# Patient Record
Sex: FEMALE | Race: WHITE | NOT HISPANIC OR LATINO | Employment: OTHER | URBAN - METROPOLITAN AREA
[De-identification: names, ages, dates, MRNs, and addresses within clinical notes are randomized per-mention and may not be internally consistent; named-entity substitution may affect disease eponyms.]

---

## 2017-02-06 DIAGNOSIS — R64 PULMONARY CACHEXIA DUE TO CHRONIC OBSTRUCTIVE PULMONARY DISEASE: ICD-10-CM

## 2017-02-06 DIAGNOSIS — J44.9 PULMONARY CACHEXIA DUE TO CHRONIC OBSTRUCTIVE PULMONARY DISEASE: ICD-10-CM

## 2017-02-06 DIAGNOSIS — F17.200 TOBACCO DEPENDENCE: ICD-10-CM

## 2017-02-06 DIAGNOSIS — J44.9 COPD, SEVERE: ICD-10-CM

## 2017-02-06 RX ORDER — FLUTICASONE PROPIONATE 250 UG/1
1 POWDER, METERED RESPIRATORY (INHALATION) 2 TIMES DAILY
Qty: 60 EACH | Refills: 3 | Status: SHIPPED | OUTPATIENT
Start: 2017-02-06 | End: 2017-06-12 | Stop reason: SDUPTHER

## 2017-05-31 ENCOUNTER — TELEPHONE (OUTPATIENT)
Dept: PULMONOLOGY | Facility: CLINIC | Age: 57
End: 2017-05-31

## 2017-05-31 NOTE — TELEPHONE ENCOUNTER
----- Message from Mady Morris LPN sent at 5/31/2017 11:33 AM CDT -----  Contact: self    ----- Message -----  From: Rosa Maria Holguin  Sent: 5/31/2017   9:56 AM  To: [facility] Staff    Patient would like to schedule an appointment at [facility] in the afternoon for the week of 6/5 to 6/9. Patient would like to be seen earlier but can not take appointment for today(5/31) or 6/1 because she will need to provide transportation a two day notice. Please call back at [phone].    Thanks,  Rosa Maria Holguin

## 2017-06-12 DIAGNOSIS — J44.9 COPD, SEVERE: ICD-10-CM

## 2017-06-12 DIAGNOSIS — R64 PULMONARY CACHEXIA DUE TO CHRONIC OBSTRUCTIVE PULMONARY DISEASE: ICD-10-CM

## 2017-06-12 DIAGNOSIS — J44.9 PULMONARY CACHEXIA DUE TO CHRONIC OBSTRUCTIVE PULMONARY DISEASE: ICD-10-CM

## 2017-06-12 DIAGNOSIS — J44.9 CHRONIC OBSTRUCTIVE PULMONARY DISEASE, UNSPECIFIED COPD TYPE: ICD-10-CM

## 2017-06-12 DIAGNOSIS — F17.200 TOBACCO DEPENDENCE: ICD-10-CM

## 2017-06-12 RX ORDER — FLUTICASONE PROPIONATE 250 UG/1
1 POWDER, METERED RESPIRATORY (INHALATION) 2 TIMES DAILY
Qty: 60 EACH | Refills: 3 | Status: SHIPPED | OUTPATIENT
Start: 2017-06-12 | End: 2017-09-25 | Stop reason: CLARIF

## 2017-06-23 ENCOUNTER — OFFICE VISIT (OUTPATIENT)
Dept: PULMONOLOGY | Facility: CLINIC | Age: 57
End: 2017-06-23
Payer: MEDICAID

## 2017-06-23 VITALS
BODY MASS INDEX: 15.4 KG/M2 | HEART RATE: 106 BPM | DIASTOLIC BLOOD PRESSURE: 70 MMHG | HEIGHT: 61 IN | OXYGEN SATURATION: 97 % | RESPIRATION RATE: 22 BRPM | SYSTOLIC BLOOD PRESSURE: 110 MMHG | WEIGHT: 81.56 LBS

## 2017-06-23 DIAGNOSIS — R64 PULMONARY CACHEXIA DUE TO COPD: Primary | ICD-10-CM

## 2017-06-23 DIAGNOSIS — J30.89 NON-SEASONAL ALLERGIC RHINITIS, UNSPECIFIED ALLERGIC RHINITIS TRIGGER: ICD-10-CM

## 2017-06-23 DIAGNOSIS — J44.9 COPD, VERY SEVERE: ICD-10-CM

## 2017-06-23 DIAGNOSIS — J44.9 PULMONARY CACHEXIA DUE TO COPD: Primary | ICD-10-CM

## 2017-06-23 DIAGNOSIS — B37.9 ANTIBIOTIC-INDUCED YEAST INFECTION: ICD-10-CM

## 2017-06-23 DIAGNOSIS — T36.95XA ANTIBIOTIC-INDUCED YEAST INFECTION: ICD-10-CM

## 2017-06-23 DIAGNOSIS — F17.200 TOBACCO DEPENDENCE: ICD-10-CM

## 2017-06-23 PROCEDURE — 99999 PR PBB SHADOW E&M-EST. PATIENT-LVL III: CPT | Mod: PBBFAC,,, | Performed by: NURSE PRACTITIONER

## 2017-06-23 PROCEDURE — 99213 OFFICE O/P EST LOW 20 MIN: CPT | Mod: PBBFAC | Performed by: NURSE PRACTITIONER

## 2017-06-23 PROCEDURE — 99214 OFFICE O/P EST MOD 30 MIN: CPT | Mod: S$PBB,,, | Performed by: NURSE PRACTITIONER

## 2017-06-23 RX ORDER — FLUCONAZOLE 150 MG/1
150 TABLET ORAL ONCE
Qty: 2 TABLET | Refills: 0 | Status: SHIPPED | OUTPATIENT
Start: 2017-06-23 | End: 2017-06-23

## 2017-06-23 RX ORDER — FLUTICASONE PROPIONATE 50 MCG
2 SPRAY, SUSPENSION (ML) NASAL DAILY
Qty: 1 BOTTLE | Refills: 11 | Status: SHIPPED | OUTPATIENT
Start: 2017-06-23

## 2017-06-23 RX ORDER — PREDNISONE 20 MG/1
TABLET ORAL
Qty: 20 TABLET | Refills: 0 | Status: SHIPPED | OUTPATIENT
Start: 2017-06-23 | End: 2017-12-20

## 2017-06-23 RX ORDER — LEVOFLOXACIN 500 MG/1
500 TABLET, FILM COATED ORAL DAILY
Qty: 10 TABLET | Refills: 0 | Status: SHIPPED | OUTPATIENT
Start: 2017-06-23 | End: 2017-07-03

## 2017-06-23 RX ORDER — IPRATROPIUM BROMIDE AND ALBUTEROL SULFATE 2.5; .5 MG/3ML; MG/3ML
3 SOLUTION RESPIRATORY (INHALATION) EVERY 4 HOURS PRN
Status: ON HOLD | COMMUNITY
End: 2017-12-29

## 2017-06-23 NOTE — ASSESSMENT & PLAN NOTE
Began Daliresp in October 7, 2016, she doubles dose for 7 days each month since obtaining.  Has lost 15 lbs since October 2106 from 96 lbs to 81 lbs.   She stopped supplements such as boost, reports has tried almost all of them available and causes diarrhea same day, so stopped in Jan 2017 high calorie supplements after many months trial off and on.   Take Daliresp only as directed once daily.  Begin adding high calorie foods with her bread daily (almond butter, nutella, cream cheese, other cheese spreads).   Eat 5-7 small high calorie meals each day.

## 2017-06-23 NOTE — ASSESSMENT & PLAN NOTE
Current smoker about 1/2 pk/day.  Complete cessation.  Declines program related to transportation and no desire for complete cessation.

## 2017-06-23 NOTE — ASSESSMENT & PLAN NOTE
Last damian 6/21/2017 FEV1 0.83 (34% predicted).   Continue flovent 250, Serevent 50 mcg/dose, Daliresp one daily only. Duo neb if needed   Stop spiriva handihaler.   Begin Incruse 62.5 mg which is increase dose indicated for severe copd   Provided levaquin and prednisone taper to have on hand if needed for acute flare.  Presently stable, no indication for antibiotic or prednisone therapy.
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
## 2017-07-17 ENCOUNTER — TELEPHONE (OUTPATIENT)
Dept: PULMONOLOGY | Facility: CLINIC | Age: 57
End: 2017-07-17

## 2017-07-17 NOTE — TELEPHONE ENCOUNTER
----- Message from Adalgisa Cisneros sent at 7/17/2017 11:38 AM CDT -----  Would like to speak to nurse about appt rescheduling and inhaler. Please call back at [phone]. thanks

## 2017-09-14 ENCOUNTER — TELEPHONE (OUTPATIENT)
Dept: PULMONOLOGY | Facility: CLINIC | Age: 57
End: 2017-09-14

## 2017-09-14 NOTE — TELEPHONE ENCOUNTER
Returned patient call, instructed to have pharmacy fax request medication needing P/A patient stated understanding

## 2017-09-25 DIAGNOSIS — J44.9 COPD, VERY SEVERE: Primary | ICD-10-CM

## 2017-09-26 ENCOUNTER — TELEPHONE (OUTPATIENT)
Dept: PULMONOLOGY | Facility: CLINIC | Age: 57
End: 2017-09-26

## 2017-11-24 DIAGNOSIS — J44.9 COPD, VERY SEVERE: ICD-10-CM

## 2017-11-28 ENCOUNTER — TELEPHONE (OUTPATIENT)
Dept: PHARMACY | Facility: CLINIC | Age: 57
End: 2017-11-28

## 2017-11-28 NOTE — TELEPHONE ENCOUNTER
Called and spoke with patient concerning Arnuity Ellipta inhaler refill to soon.     Patient indicated that she does not use our pharmacy and only uses [facility].    Called [facility] and Arnuity Ellipta is ready for a $3.00 copayment.    Called patient back to let her know prescription was ready at [facility].

## 2017-12-18 ENCOUNTER — TELEPHONE (OUTPATIENT)
Dept: PULMONOLOGY | Facility: CLINIC | Age: 57
End: 2017-12-18

## 2017-12-18 NOTE — TELEPHONE ENCOUNTER
Pt unable to get transportation to [facility] for appt offered for today. Pt states she will go to urgent care close to her home instead.

## 2017-12-18 NOTE — TELEPHONE ENCOUNTER
----- Message from Jessica Sanchez sent at 12/18/2017  9:38 AM CST -----  Contact: pt  The pt states she needs a antibiotic called in, no additional info given, pt can be reached at [phone]///[email or web address]

## 2017-12-19 ENCOUNTER — TELEPHONE (OUTPATIENT)
Dept: PULMONOLOGY | Facility: CLINIC | Age: 57
End: 2017-12-19

## 2017-12-19 DIAGNOSIS — R05.9 COUGH: Primary | ICD-10-CM

## 2017-12-19 NOTE — TELEPHONE ENCOUNTER
Returned patient call patient c/o increased cough, appointment schedule for 12/20/17 with chest xray patient stated understanding

## 2017-12-20 ENCOUNTER — HOSPITAL ENCOUNTER (OUTPATIENT)
Dept: RADIOLOGY | Facility: HOSPITAL | Age: 57
Discharge: HOME | End: 2017-12-20
Attending: NURSE PRACTITIONER
Payer: MEDICAID

## 2017-12-20 ENCOUNTER — OFFICE VISIT (OUTPATIENT)
Dept: SLEEP MEDICINE | Facility: CLINIC | Age: 57
End: 2017-12-20
Payer: MEDICAID

## 2017-12-20 VITALS
RESPIRATION RATE: 16 BRPM | DIASTOLIC BLOOD PRESSURE: 74 MMHG | HEIGHT: 61 IN | WEIGHT: 94.38 LBS | HEART RATE: 109 BPM | OXYGEN SATURATION: 93 % | BODY MASS INDEX: 17.82 KG/M2 | SYSTOLIC BLOOD PRESSURE: 116 MMHG

## 2017-12-20 DIAGNOSIS — J96.10 CHRONIC RESPIRATORY FAILURE, UNSPECIFIED WHETHER WITH HYPOXIA OR HYPERCAPNIA: ICD-10-CM

## 2017-12-20 DIAGNOSIS — J44.9 COPD, VERY SEVERE: ICD-10-CM

## 2017-12-20 DIAGNOSIS — J44.1 COPD EXACERBATION: Primary | ICD-10-CM

## 2017-12-20 PROCEDURE — 71020 XR CHEST PA AND LATERAL: CPT | Mod: 26,,, | Performed by: RADIOLOGY

## 2017-12-20 PROCEDURE — 71020 XR CHEST PA AND LATERAL: CPT | Mod: TC,PO

## 2017-12-20 PROCEDURE — 99999 PR PBB SHADOW E&M-EST. PATIENT-LVL III: CPT | Mod: PBBFAC,,, | Performed by: NURSE PRACTITIONER

## 2017-12-20 PROCEDURE — 99214 OFFICE O/P EST MOD 30 MIN: CPT | Mod: S$PBB,,, | Performed by: NURSE PRACTITIONER

## 2017-12-20 PROCEDURE — 99213 OFFICE O/P EST LOW 20 MIN: CPT | Mod: PBBFAC,25,PO | Performed by: NURSE PRACTITIONER

## 2017-12-20 RX ORDER — PREDNISONE 20 MG/1
TABLET ORAL
Qty: 21 TABLET | Refills: 0 | Status: ON HOLD | OUTPATIENT
Start: 2017-12-20 | End: 2017-12-29

## 2017-12-20 RX ORDER — AZITHROMYCIN 250 MG/1
TABLET, FILM COATED ORAL
Qty: 6 TABLET | Refills: 0 | Status: ON HOLD | OUTPATIENT
Start: 2017-12-20 | End: 2017-12-29 | Stop reason: HOSPADM

## 2017-12-20 NOTE — PROGRESS NOTES
"Subjective:      Patient ID: Candace Wright is a 57 y.o. female.    Chief Complaint: Cough    HPI  Patient with COPD, chronic respiratory failure, severe scoliosis presents to the office today with complaints of increased cough and congestion.  She has darker colored mucus.  Night sweats.  She is compliant with her pulmonary inhalers.  Increase cough for 5 days.  She states her symptoms started off as ALLERGIES, postnasal drip which stripped down into her chest      /74   Pulse 109   Resp 16   Ht 5' 1.2" (1.554 m)   Wt 42.8 kg (94 lb 5.7 oz)   LMP  (LMP Unknown)   SpO2 (!) 93%   BMI 17.71 kg/m²   Body mass index is 17.71 kg/m².    Review of Systems   Constitutional: Positive for night sweats.   HENT: Positive for postnasal drip.    Respiratory: Positive for cough, sputum production, shortness of breath and wheezing.    Gastrointestinal: Negative.      Objective:      Physical Exam   Constitutional: She is oriented to person, place, and time. She appears well-developed and well-nourished.   HENT:   Head: Normocephalic and atraumatic.   Neck: Normal range of motion. Neck supple.   Cardiovascular: Normal rate and regular rhythm.    Pulmonary/Chest: She has wheezes.   Rhonchi which improves with cough   Musculoskeletal: Normal range of motion. She exhibits no edema.   Neurological: She is alert and oriented to person, place, and time.   Skin: Skin is warm and dry.   Psychiatric: She has a normal mood and affect.     Personal Diagnostic Review      Assessment:       1. COPD exacerbation    2. Chronic respiratory failure, unspecified whether with hypoxia or hypercapnia        Outpatient Encounter Prescriptions as of 12/20/2017   Medication Sig Dispense Refill    albuterol-ipratropium 2.5mg-0.5mg/3mL (DUO-NEB) 0.5 mg-3 mg(2.5 mg base)/3 mL nebulizer solution Take 3 mLs by nebulization every 4 (four) hours as needed for Wheezing or Shortness of Breath. Rescue      fluticasone (FLONASE) 50 mcg/actuation " nasal spray 2 sprays by Each Nare route once daily. 1 Bottle 11    fluticasone furoate (ARNUITY ELLIPTA) 100 mcg/actuation DsDv Inhale 100 mcg into the lungs once daily. Controller 30 each 5    morphine (MS CONTIN) 30 MG 12 hr tablet   0    oxycodone-acetaminophen (PERCOCET)  mg per tablet Take 1 tablet by mouth every 6 (six) hours as needed for Pain (Pain). 10 tablet 0    OXYGEN-AIR DELIVERY SYSTEMS MISC by Misc.(Non-Drug; Combo Route) route.      roflumilast 500 mcg Tab Take 1 tablet (500 mcg total) by mouth once daily. 30 tablet 12    salmeterol (SEREVENT) 50 mcg/dose diskus inhaler Inhale 1 puff into the lungs 2 (two) times daily. 1 each 5    umeclidinium 62.5 mcg/actuation DsDv Inhale 1 puff into the lungs once daily. 30 each 11    [DISCONTINUED] predniSONE (DELTASONE) 20 MG tablet 3 daily x 3 days, 2 daily x 3 days, 1 daily x 3 days, 1/2 daily x 4 days. 20 tablet 0    azithromycin (ZITHROMAX Z-MELI) 250 MG tablet Take pack as directed 6 tablet 0    predniSONE (DELTASONE) 20 MG tablet 3 tab for 3 days. 2 tab for 3 days. 1 tab for 3 days. 1/2 tab for 3 days 21 tablet 0     No facility-administered encounter medications on file as of 12/20/2017.      No orders of the defined types were placed in this encounter.    Plan:      zpak, prednsione taper.  Continue current pulmonary regimen.

## 2017-12-27 ENCOUNTER — TELEPHONE (OUTPATIENT)
Dept: PULMONOLOGY | Facility: CLINIC | Age: 57
End: 2017-12-27

## 2017-12-27 NOTE — TELEPHONE ENCOUNTER
Patient complains of cough not getting any better. Seen 12/20/17 treated with zpak and prednisone. Patient requesting something for cough to be sent to her pharmacy

## 2017-12-27 NOTE — TELEPHONE ENCOUNTER
----- Message from Adalgisa Snachez sent at 12/27/2017 10:12 AM CST -----  Patient state she was in the office on last week and was prescribed an antibiotic. State she is out of the antibiotic, but her condition is not getting better. Please adv/call [phone].//thanks. cw

## 2017-12-28 ENCOUNTER — HOSPITAL ENCOUNTER (OUTPATIENT)
Facility: HOSPITAL | Age: 57
Discharge: HOME | End: 2017-12-29
Attending: EMERGENCY MEDICINE | Admitting: INTERNAL MEDICINE
Payer: MEDICAID

## 2017-12-28 DIAGNOSIS — R06.02 SOB (SHORTNESS OF BREATH): ICD-10-CM

## 2017-12-28 DIAGNOSIS — J96.21 ACUTE ON CHRONIC RESPIRATORY FAILURE WITH HYPOXIA: ICD-10-CM

## 2017-12-28 DIAGNOSIS — J44.1 COPD WITH ACUTE EXACERBATION: Primary | ICD-10-CM

## 2017-12-28 LAB
ALBUMIN SERPL BCP-MCNC: 3.3 G/DL
ALLENS TEST: ABNORMAL
ALP SERPL-CCNC: 93 U/L
ALT SERPL W/O P-5'-P-CCNC: 10 U/L
ANION GAP SERPL CALC-SCNC: 11 MMOL/L
AST SERPL-CCNC: 13 U/L
BASOPHILS # BLD AUTO: 0.01 K/UL
BASOPHILS NFR BLD: 0.1 %
BILIRUB SERPL-MCNC: 0.2 MG/DL
BNP SERPL-MCNC: 28 PG/ML
BUN SERPL-MCNC: 14 MG/DL
CALCIUM SERPL-MCNC: 8.5 MG/DL
CHLORIDE SERPL-SCNC: 104 MMOL/L
CK SERPL-CCNC: 81 U/L
CO2 SERPL-SCNC: 25 MMOL/L
CREAT SERPL-MCNC: 0.7 MG/DL
D DIMER PPP IA.FEU-MCNC: <0.19 MG/L FEU
DELSYS: ABNORMAL
DIFFERENTIAL METHOD: ABNORMAL
EOSINOPHIL # BLD AUTO: 0 K/UL
EOSINOPHIL NFR BLD: 0.1 %
ERYTHROCYTE [DISTWIDTH] IN BLOOD BY AUTOMATED COUNT: 14.6 %
EST. GFR  (AFRICAN AMERICAN): >60 ML/MIN/1.73 M^2
EST. GFR  (NON AFRICAN AMERICAN): >60 ML/MIN/1.73 M^2
FIO2: 32
FLUAV AG SPEC QL IA: NEGATIVE
FLUBV AG SPEC QL IA: NEGATIVE
GLUCOSE SERPL-MCNC: 107 MG/DL
HCO3 UR-SCNC: 28.4 MMOL/L (ref 24–28)
HCT VFR BLD AUTO: 39.7 %
HGB BLD-MCNC: 13.3 G/DL
LYMPHOCYTES # BLD AUTO: 0.6 K/UL
LYMPHOCYTES NFR BLD: 3.2 %
MCH RBC QN AUTO: 31.4 PG
MCHC RBC AUTO-ENTMCNC: 33.5 G/DL
MCV RBC AUTO: 94 FL
MODE: ABNORMAL
MONOCYTES # BLD AUTO: 0.6 K/UL
MONOCYTES NFR BLD: 3.3 %
NEUTROPHILS # BLD AUTO: 18.4 K/UL
NEUTROPHILS NFR BLD: 93.3 %
PCO2 BLDA: 39.3 MMHG (ref 35–45)
PH SMN: 7.47 [PH] (ref 7.35–7.45)
PLATELET # BLD AUTO: 367 K/UL
PMV BLD AUTO: 8.7 FL
PO2 BLDA: 68 MMHG (ref 80–100)
POC BE: 5 MMOL/L
POC SATURATED O2: 94 % (ref 95–100)
POTASSIUM SERPL-SCNC: 4.7 MMOL/L
PROT SERPL-MCNC: 6.7 G/DL
RBC # BLD AUTO: 4.23 M/UL
SAMPLE: ABNORMAL
SITE: ABNORMAL
SODIUM SERPL-SCNC: 140 MMOL/L
SPECIMEN SOURCE: NORMAL
T4 FREE SERPL-MCNC: 0.99 NG/DL
TROPONIN I SERPL DL<=0.01 NG/ML-MCNC: <0.006 NG/ML
TROPONIN I SERPL DL<=0.01 NG/ML-MCNC: <0.006 NG/ML
TSH SERPL DL<=0.005 MIU/L-ACNC: 0.03 UIU/ML
WBC # BLD AUTO: 19.67 K/UL

## 2017-12-28 PROCEDURE — 84484 ASSAY OF TROPONIN QUANT: CPT | Mod: 91

## 2017-12-28 PROCEDURE — 85025 COMPLETE CBC W/AUTO DIFF WBC: CPT

## 2017-12-28 PROCEDURE — G0378 HOSPITAL OBSERVATION PER HR: HCPCS

## 2017-12-28 PROCEDURE — 85379 FIBRIN DEGRADATION QUANT: CPT

## 2017-12-28 PROCEDURE — 94640 AIRWAY INHALATION TREATMENT: CPT

## 2017-12-28 PROCEDURE — 93010 ELECTROCARDIOGRAM REPORT: CPT | Mod: ,,, | Performed by: INTERNAL MEDICINE

## 2017-12-28 PROCEDURE — 25000242 PHARM REV CODE 250 ALT 637 W/ HCPCS: Performed by: PHYSICIAN ASSISTANT

## 2017-12-28 PROCEDURE — 83880 ASSAY OF NATRIURETIC PEPTIDE: CPT

## 2017-12-28 PROCEDURE — 82550 ASSAY OF CK (CPK): CPT

## 2017-12-28 PROCEDURE — 36415 COLL VENOUS BLD VENIPUNCTURE: CPT

## 2017-12-28 PROCEDURE — 84443 ASSAY THYROID STIM HORMONE: CPT

## 2017-12-28 PROCEDURE — 87400 INFLUENZA A/B EACH AG IA: CPT | Mod: 59

## 2017-12-28 PROCEDURE — 99285 EMERGENCY DEPT VISIT HI MDM: CPT | Mod: 25

## 2017-12-28 PROCEDURE — 82803 BLOOD GASES ANY COMBINATION: CPT

## 2017-12-28 PROCEDURE — 25000003 PHARM REV CODE 250: Performed by: PHYSICIAN ASSISTANT

## 2017-12-28 PROCEDURE — 80053 COMPREHEN METABOLIC PANEL: CPT

## 2017-12-28 PROCEDURE — 84484 ASSAY OF TROPONIN QUANT: CPT

## 2017-12-28 PROCEDURE — 84439 ASSAY OF FREE THYROXINE: CPT

## 2017-12-28 PROCEDURE — 25000242 PHARM REV CODE 250 ALT 637 W/ HCPCS: Performed by: EMERGENCY MEDICINE

## 2017-12-28 PROCEDURE — 36600 WITHDRAWAL OF ARTERIAL BLOOD: CPT

## 2017-12-28 PROCEDURE — 63600175 PHARM REV CODE 636 W HCPCS: Performed by: PHYSICIAN ASSISTANT

## 2017-12-28 PROCEDURE — 99900035 HC TECH TIME PER 15 MIN (STAT)

## 2017-12-28 PROCEDURE — 25000003 PHARM REV CODE 250: Performed by: NURSE PRACTITIONER

## 2017-12-28 PROCEDURE — 27000221 HC OXYGEN, UP TO 24 HOURS

## 2017-12-28 PROCEDURE — 93005 ELECTROCARDIOGRAM TRACING: CPT

## 2017-12-28 RX ORDER — MORPHINE SULFATE 30 MG/1
30 TABLET, FILM COATED, EXTENDED RELEASE ORAL EVERY 12 HOURS
Status: DISCONTINUED | OUTPATIENT
Start: 2017-12-28 | End: 2017-12-28

## 2017-12-28 RX ORDER — ONDANSETRON 2 MG/ML
4 INJECTION INTRAMUSCULAR; INTRAVENOUS EVERY 12 HOURS PRN
Status: DISCONTINUED | OUTPATIENT
Start: 2017-12-28 | End: 2017-12-29 | Stop reason: HOSPADM

## 2017-12-28 RX ORDER — OXYCODONE AND ACETAMINOPHEN 10; 325 MG/1; MG/1
1 TABLET ORAL EVERY 4 HOURS PRN
Status: DISCONTINUED | OUTPATIENT
Start: 2017-12-28 | End: 2017-12-29 | Stop reason: HOSPADM

## 2017-12-28 RX ORDER — LEVALBUTEROL INHALATION SOLUTION 0.63 MG/3ML
0.63 SOLUTION RESPIRATORY (INHALATION)
Status: DISCONTINUED | OUTPATIENT
Start: 2017-12-28 | End: 2017-12-29

## 2017-12-28 RX ORDER — IPRATROPIUM BROMIDE AND ALBUTEROL SULFATE 2.5; .5 MG/3ML; MG/3ML
3 SOLUTION RESPIRATORY (INHALATION)
Status: DISCONTINUED | OUTPATIENT
Start: 2017-12-28 | End: 2017-12-28

## 2017-12-28 RX ORDER — OXYCODONE AND ACETAMINOPHEN 10; 325 MG/1; MG/1
1 TABLET ORAL EVERY 8 HOURS PRN
Status: DISCONTINUED | OUTPATIENT
Start: 2017-12-28 | End: 2017-12-28

## 2017-12-28 RX ORDER — METHYLPREDNISOLONE SOD SUCC 125 MG
125 VIAL (EA) INJECTION
Status: DISCONTINUED | OUTPATIENT
Start: 2017-12-28 | End: 2017-12-28 | Stop reason: SDUPTHER

## 2017-12-28 RX ORDER — ACETAMINOPHEN 325 MG/1
650 TABLET ORAL EVERY 8 HOURS PRN
Status: DISCONTINUED | OUTPATIENT
Start: 2017-12-28 | End: 2017-12-28

## 2017-12-28 RX ORDER — IPRATROPIUM BROMIDE AND ALBUTEROL SULFATE 2.5; .5 MG/3ML; MG/3ML
3 SOLUTION RESPIRATORY (INHALATION)
Status: COMPLETED | OUTPATIENT
Start: 2017-12-28 | End: 2017-12-28

## 2017-12-28 RX ORDER — FLUTICASONE PROPIONATE 50 MCG
2 SPRAY, SUSPENSION (ML) NASAL DAILY
Status: DISCONTINUED | OUTPATIENT
Start: 2017-12-29 | End: 2017-12-29 | Stop reason: HOSPADM

## 2017-12-28 RX ORDER — MOXIFLOXACIN HYDROCHLORIDE 400 MG/1
400 TABLET ORAL DAILY
Status: DISCONTINUED | OUTPATIENT
Start: 2017-12-28 | End: 2017-12-29 | Stop reason: HOSPADM

## 2017-12-28 RX ORDER — MOXIFLOXACIN HYDROCHLORIDE 400 MG/1
400 TABLET ORAL DAILY
Status: DISCONTINUED | OUTPATIENT
Start: 2017-12-28 | End: 2017-12-28

## 2017-12-28 RX ORDER — BUDESONIDE 0.5 MG/2ML
0.5 INHALANT ORAL EVERY 12 HOURS
Status: DISCONTINUED | OUTPATIENT
Start: 2017-12-28 | End: 2017-12-29 | Stop reason: HOSPADM

## 2017-12-28 RX ORDER — ENOXAPARIN SODIUM 100 MG/ML
40 INJECTION SUBCUTANEOUS EVERY 24 HOURS
Status: DISCONTINUED | OUTPATIENT
Start: 2017-12-28 | End: 2017-12-29 | Stop reason: HOSPADM

## 2017-12-28 RX ORDER — PROMETHAZINE HYDROCHLORIDE AND CODEINE PHOSPHATE 6.25; 1 MG/5ML; MG/5ML
5 SOLUTION ORAL ONCE
Status: COMPLETED | OUTPATIENT
Start: 2017-12-28 | End: 2017-12-28

## 2017-12-28 RX ORDER — ROFLUMILAST 500 UG/1
500 TABLET ORAL DAILY
Status: DISCONTINUED | OUTPATIENT
Start: 2017-12-29 | End: 2017-12-29 | Stop reason: HOSPADM

## 2017-12-28 RX ORDER — ARFORMOTEROL TARTRATE 15 UG/2ML
15 SOLUTION RESPIRATORY (INHALATION) 2 TIMES DAILY
Status: DISCONTINUED | OUTPATIENT
Start: 2017-12-28 | End: 2017-12-29 | Stop reason: HOSPADM

## 2017-12-28 RX ADMIN — PROMETHAZINE HYDROCHLORIDE AND CODEINE PHOSPHATE 5 ML: 6.25; 1 SYRUP ORAL at 11:12

## 2017-12-28 RX ADMIN — METHYLPREDNISOLONE SODIUM SUCCINATE 80 MG: 40 INJECTION, POWDER, FOR SOLUTION INTRAMUSCULAR; INTRAVENOUS at 09:12

## 2017-12-28 RX ADMIN — IPRATROPIUM BROMIDE AND ALBUTEROL SULFATE 3 ML: .5; 3 SOLUTION RESPIRATORY (INHALATION) at 09:12

## 2017-12-28 RX ADMIN — BUDESONIDE 0.5 MG: 0.5 SUSPENSION RESPIRATORY (INHALATION) at 09:12

## 2017-12-28 RX ADMIN — ARFORMOTEROL TARTRATE 15 MCG: 15 SOLUTION RESPIRATORY (INHALATION) at 09:12

## 2017-12-28 RX ADMIN — IPRATROPIUM BROMIDE AND ALBUTEROL SULFATE 3 ML: .5; 3 SOLUTION RESPIRATORY (INHALATION) at 02:12

## 2017-12-28 RX ADMIN — OXYCODONE HYDROCHLORIDE AND ACETAMINOPHEN 1 TABLET: 10; 325 TABLET ORAL at 11:12

## 2017-12-28 RX ADMIN — OXYCODONE HYDROCHLORIDE AND ACETAMINOPHEN 1 TABLET: 10; 325 TABLET ORAL at 06:12

## 2017-12-28 RX ADMIN — MOXIFLOXACIN HYDROCHLORIDE 400 MG: 400 TABLET, FILM COATED ORAL at 06:12

## 2017-12-28 RX ADMIN — GUAIFENESIN AND DEXTROMETHORPHAN HYDROBROMIDE 1 TABLET: 600; 30 TABLET, EXTENDED RELEASE ORAL at 09:12

## 2017-12-28 NOTE — ASSESSMENT & PLAN NOTE
-Due to COPD exacerbation.   -Continue oxygen therapy.   -Follow up d-dimer. VQ scan if positive.

## 2017-12-28 NOTE — ED PROVIDER NOTES
"SCRIBE #1 NOTE: I, Sincere Moscosoer, am scribing for, and in the presence of, Obed Macedo MD. I have scribed the entire note.      History      Chief Complaint   Patient presents with    Shortness of Breath       Review of patient's allergies indicates:   Allergen Reactions    Azithromycin Hives     Face swelling and welts    Iodinated contrast- oral and iv dye      "I could die. I had it once in [place] years ago and haven't had it since."     Asa [aspirin]     Bactrim [sulfamethoxazole-trimethoprim] Hives    Doxycycline Hives    Ibuprofen     Pcn [penicillins]     Avelox [moxifloxacin] Rash        HPI   HPI    12/28/2017, 1:32 PM   History obtained from the patient      History of Present Illness: Candace Wright is a 57 y.o. female patient with a PMHx of COPD, who presents to the Emergency Department for SOB which onset gradually today. Sxs are constant and moderate in severity. Pt reports her sxs have been worsening since 12/20. Pt saw PCP on 12/20 for allergies and was Rx prednisone and z-pack without relief. There are no mitigating or exacerbating factors noted. Associated sxs include wheezing.  Pt denies any fever, N/V/D, CP, leg swelling, calf pain, numbness, tingling,  and all other sxs at this time. No further complaints or concerns at this time.       Arrival mode: [facility]    PCP: Clara Meraz MD       Past Medical History:  Past Medical History:   Diagnosis Date    Cervical disc disorder with radiculopathy     COPD (chronic obstructive pulmonary disease)     Lumbar disc disease     Scoliosis     Spondylosis        Past Surgical History:  Past Surgical History:   Procedure Laterality Date    BREAST SURGERY      HYSTERECTOMY      PELVIC LAPAROSCOPY           Family History:  Family History   Problem Relation Age of Onset    Leukemia      Cancer         Social History:  Social History     Social History Main Topics    Smoking status: Light Tobacco Smoker     Types: " Cigarettes    Smokeless tobacco: unknown    Alcohol use No    Drug use: No    Sexual activity: unknown       ROS   Review of Systems   Constitutional: Negative for fever.   HENT: Negative for sore throat.    Respiratory: Positive for shortness of breath. Negative for cough.    Cardiovascular: Positive for chest pain. Negative for palpitations and leg swelling.   Gastrointestinal: Negative for abdominal pain, constipation, diarrhea, nausea and vomiting.   Genitourinary: Negative for dysuria.   Musculoskeletal: Negative for back pain.   Skin: Negative for rash.   Neurological: Negative for dizziness, syncope, weakness, numbness and headaches.   Hematological: Does not bruise/bleed easily.     Physical Exam      Initial Vitals   BP Pulse Resp Temp SpO2   12/28/17 1305 12/28/17 1305 12/28/17 1305 12/28/17 1319 12/28/17 1305   138/71 (!) 120 18 98.9 °F (37.2 °C) 95 %      MAP       12/28/17 1305       93.33          Physical Exam  Nursing Notes and Vital Signs Reviewed.  Constitutional: Patient is in no acute distress. Well-developed and well-nourished.  Head: Atraumatic. Normocephalic.  Eyes: PERRL. EOM intact. Conjunctivae are not pale. No scleral icterus.  ENT: Mucous membranes are moist. Oropharynx is clear and symmetric.    Neck: Supple. Full ROM. No lymphadenopathy.  Cardiovascular: Tachycardia. Regular rhythm. No murmurs, rubs, or gallops. Distal pulses are 2+ and symmetric.  Pulmonary/Chest: No respiratory distress. Diffuse wheezing. Decreased breath sounds bilaterally.  Abdominal: Soft and non-distended.  There is no tenderness.  No rebound, guarding, or rigidity. Good bowel sounds.  Genitourinary: No CVA tenderness  Musculoskeletal: Moves all extremities. No obvious deformities. No edema. No calf tenderness.  Skin: Warm and dry.  Neurological:  Alert, awake, and appropriate.  Normal speech.  No acute focal neurological deficits are appreciated.  Psychiatric: Normal affect. Good eye contact. Appropriate in  content.    ED Course    Procedures  ED Vital Signs:  Vitals:    12/28/17 1305 12/28/17 1319 12/28/17 1407 12/28/17 1419   BP: 138/71      Pulse: (!) 120  91 105   Resp: 18  20    Temp:  98.9 °F (37.2 °C)     TempSrc:  Oral     SpO2: 95%  97%        Abnormal Lab Results:  Labs Reviewed   CBC W/ AUTO DIFFERENTIAL - Abnormal; Notable for the following:        Result Value    WBC 19.67 (*)     MCH 31.4 (*)     RDW 14.6 (*)     Platelets 367 (*)     MPV 8.7 (*)     Gran # 18.4 (*)     Lymph # 0.6 (*)     Gran% 93.3 (*)     Lymph% 3.2 (*)     Mono% 3.3 (*)     All other components within normal limits   COMPREHENSIVE METABOLIC PANEL - Abnormal; Notable for the following:     Calcium 8.5 (*)     Albumin 3.3 (*)     All other components within normal limits   ISTAT PROCEDURE - Abnormal; Notable for the following:     POC PH 7.466 (*)     POC PO2 68 (*)     POC HCO3 28.4 (*)     POC SATURATED O2 94 (*)     All other components within normal limits   B-TYPE NATRIURETIC PEPTIDE   CK   TROPONIN I   INFLUENZA A AND B ANTIGEN   URINALYSIS   D DIMER, QUANTITATIVE        All Lab Results:  Results for orders placed or performed during the hospital encounter of 12/28/17   CBC auto differential   Result Value Ref Range    WBC 19.67 (H) 3.90 - 12.70 K/uL    RBC 4.23 4.00 - 5.40 M/uL    Hemoglobin 13.3 12.0 - 16.0 g/dL    Hematocrit 39.7 37.0 - 48.5 %    MCV 94 82 - 98 fL    MCH 31.4 (H) 27.0 - 31.0 pg    MCHC 33.5 32.0 - 36.0 g/dL    RDW 14.6 (H) 11.5 - 14.5 %    Platelets 367 (H) 150 - 350 K/uL    MPV 8.7 (L) 9.2 - 12.9 fL    Gran # 18.4 (H) 1.8 - 7.7 K/uL    Lymph # 0.6 (L) 1.0 - 4.8 K/uL    Mono # 0.6 0.3 - 1.0 K/uL    Eos # 0.0 0.0 - 0.5 K/uL    Baso # 0.01 0.00 - 0.20 K/uL    Gran% 93.3 (H) 38.0 - 73.0 %    Lymph% 3.2 (L) 18.0 - 48.0 %    Mono% 3.3 (L) 4.0 - 15.0 %    Eosinophil% 0.1 0.0 - 8.0 %    Basophil% 0.1 0.0 - 1.9 %    Differential Method Automated    Comprehensive metabolic panel   Result Value Ref Range    Sodium 140  136 - 145 mmol/L    Potassium 4.7 3.5 - 5.1 mmol/L    Chloride 104 95 - 110 mmol/L    CO2 25 23 - 29 mmol/L    Glucose 107 70 - 110 mg/dL    BUN, Bld 14 6 - 20 mg/dL    Creatinine 0.7 0.5 - 1.4 mg/dL    Calcium 8.5 (L) 8.7 - 10.5 mg/dL    Total Protein 6.7 6.0 - 8.4 g/dL    Albumin 3.3 (L) 3.5 - 5.2 g/dL    Total Bilirubin 0.2 0.1 - 1.0 mg/dL    Alkaline Phosphatase 93 55 - 135 U/L    AST 13 10 - 40 U/L    ALT 10 10 - 44 U/L    Anion Gap 11 8 - 16 mmol/L    eGFR if African American >60 >60 mL/min/1.73 m^2    eGFR if non African American >60 >60 mL/min/1.73 m^2   Brain natriuretic peptide   Result Value Ref Range    BNP 28 0 - 99 pg/mL   CK   Result Value Ref Range    CPK 81 20 - 180 U/L   Troponin I   Result Value Ref Range    Troponin I <0.006 0.000 - 0.026 ng/mL   Influenza antigen Nasopharyngeal Swab   Result Value Ref Range    Influenza A Ag, EIA Negative Negative    Influenza B Ag, EIA Negative Negative    Flu A & B Source Nasopharyngeal Swab    ISTAT PROCEDURE   Result Value Ref Range    POC PH 7.466 (H) 7.35 - 7.45    POC PCO2 39.3 35 - 45 mmHg    POC PO2 68 (L) 80 - 100 mmHg    POC HCO3 28.4 (H) 24 - 28 mmol/L    POC BE 5 -2 to 2 mmol/L    POC SATURATED O2 94 (L) 95 - 100 %    Sample ARTERIAL     Site RR     Allens Test Pass     DelSys Nasal Can     Mode SPONT     FiO2 32          Imaging Results:  Imaging Results          X-Ray Chest AP Portable (Final result)  Result time 12/28/17 14:00:42    Final result by Jo-Ann Vazquez III, MD (12/28/17 14:00:42)                 Impression:     Stable COPD.  No acute disease identified in the chest.        Electronically signed by: JO-ANN VAZQUEZ MD  Date:     12/28/17  Time:    14:00              Narrative:    XR CHEST AP PORTABLE    Clinical history: sob.      Findings: Cardiomediastinal silhouette is within normal limits for AP technique.  Stable COPD with pulmonary hyperinflation and flattening of the diaphragms.  Stable chronic blunting of the costophrenic angles  suggesting pleural scarring.  Stable small bilateral calcified granulomas. The lungs appear clear of active disease. No acute appearing infiltrate, sizable pleural effusion, pneumothorax or other acute disease identified.  Stable significant thoracolumbar scoliosis.                                        The EKG was ordered, reviewed, and independently interpreted by the ED provider.  Interpretation time: 14:06  Rate: 102 BPM  Rhythm: sinus tachycardia  Interpretation: No acute ST changes. No STEMI.    The Emergency Provider reviewed the vital signs and test results, which are outlined above.    ED Discussion     2:59 PM: Re-evaluated pt. I have discussed test results, shared treatment plan, and the need for admission with patient at bedside. Pt expresses understanding at this time and agree with all information. All questions answered. Pt has no further questions or concerns at this time. Pt is ready for admit.    3:03 PM: Discussed case with HONG Pagan ([facility]). Kandace agrees with current care and management of pt and accepts admission.   Admitting Service: Hospital medicine   Admitting Physician: Dr. Kapadia  Admit to: Observation      ED Medication(s):  Medications   methylPREDNISolone sodium succinate injection 125 mg (125 mg Intravenous Not Given 12/28/17 1345)   albuterol-ipratropium 2.5mg-0.5mg/3mL nebulizer solution 3 mL (3 mLs Nebulization Given 12/28/17 1407)       New Prescriptions    No medications on file             Medical Decision Making    Medical Decision Making:   Clinical Tests:   Lab Tests: Reviewed and Ordered  Radiological Study: Reviewed and Ordered  Medical Tests: Reviewed and Ordered           Scribe Attestation:   Scribe #1: I performed the above scribed service and the documentation accurately describes the services I performed. I attest to the accuracy of the note.    Attending:   Physician Attestation Statement for Scribe #1: I, Obed Macedo MD, personally  performed the services described in this documentation, as scribed by Sincere Fournier, in my presence, and it is both accurate and complete.          Clinical Impression       ICD-10-CM ICD-9-CM   1. COPD with acute exacerbation J44.1 491.21   2. SOB (shortness of breath) R06.02 786.05       Disposition:   Disposition: Placed in Observation  Condition: Fair         Obed Macedo MD  12/28/17 4337

## 2017-12-28 NOTE — H&P
[facility] Hospital Medicine  History & Physical    Patient Name: Candace Wright  MRN: [id number]  Admission Date: 12/28/2017  Attending Physician: Obed Macedo MD   Primary Care Provider: Clara Meraz MD         Patient information was obtained from patient, past medical records and ER records.     Subjective:     Principal Problem:Acute on chronic respiratory failure with hypoxia    Chief Complaint:   Chief Complaint   Patient presents with    Shortness of Breath        HPI: Candace Wright is a 57 year old female with COPD and continued tobacco abuse who presented to the Emergency Room with complaints of SOB and cough. The patient reports that her SOB is worse with exertion, but she denies orthopnea. The cough is productive with green sputum production. Symptoms began 2 weeks ago. She was seen in the Pulmonology clinic on 12/20/17 and prescribed Azithromycin and a prednisone taper. She is almost finished with the prednisone and completed the antibiotic several days ago. She denies any improvement in symptoms with these interventions. She has been taking her nebulizer treatments several times daily without any improvement. There is associated intermittent chest heaviness with each nebulizer treatment. She denies fever and chills. In the ED, the patient was found to have mild hypoxia on ABG with a pO2 of 68 on her home dose of nasal cannula oxygen. Her chest x-ray was negative. She had mild tachycardia and her d-dimer is pending. Code status was discussed with the patient. She is a DNR. Her friend, Lindsay Sims, is her surrogate medical decision maker.     Past Medical History:   Diagnosis Date    Cervical disc disorder with radiculopathy     COPD (chronic obstructive pulmonary disease)     Lumbar disc disease     Scoliosis     Spondylosis        Past Surgical History:   Procedure Laterality Date    BREAST SURGERY      HYSTERECTOMY      PELVIC LAPAROSCOPY         Review of  "patient's allergies indicates:   Allergen Reactions    Azithromycin Hives     Face swelling and welts    Iodinated contrast- oral and iv dye      "I could die. I had it once in [place] years ago and haven't had it since."     Asa [aspirin]     Bactrim [sulfamethoxazole-trimethoprim] Hives    Doxycycline Hives    Ibuprofen     Pcn [penicillins]     Avelox [moxifloxacin] Rash       No current facility-administered medications on file prior to encounter.      Current Outpatient Prescriptions on File Prior to Encounter   Medication Sig    albuterol-ipratropium 2.5mg-0.5mg/3mL (DUO-NEB) 0.5 mg-3 mg(2.5 mg base)/3 mL nebulizer solution Take 3 mLs by nebulization every 4 (four) hours as needed for Wheezing or Shortness of Breath. Rescue    azithromycin (ZITHROMAX Z-MELI) 250 MG tablet Take pack as directed    fluticasone (FLONASE) 50 mcg/actuation nasal spray 2 sprays by Each Nare route once daily.    fluticasone furoate (ARNUITY ELLIPTA) 100 mcg/actuation DsDv Inhale 100 mcg into the lungs once daily. Controller    morphine (MS CONTIN) 30 MG 12 hr tablet     oxycodone-acetaminophen (PERCOCET)  mg per tablet Take 1 tablet by mouth every 6 (six) hours as needed for Pain (Pain).    OXYGEN-AIR DELIVERY SYSTEMS MISC by [facility].(Non-Drug; Combo Route) route.    predniSONE (DELTASONE) 20 MG tablet 3 tab for 3 days. 2 tab for 3 days. 1 tab for 3 days. 1/2 tab for 3 days    roflumilast 500 mcg Tab Take 1 tablet (500 mcg total) by mouth once daily.    salmeterol (SEREVENT) 50 mcg/dose diskus inhaler Inhale 1 puff into the lungs 2 (two) times daily.    umeclidinium 62.5 mcg/actuation DsDv Inhale 1 puff into the lungs once daily.     Family History     Problem Relation (Age of Onset)    Cancer     Leukemia         Social History Main Topics    Smoking status: Light Tobacco Smoker     Types: Cigarettes    Smokeless tobacco: Not on file    Alcohol use No    Drug use: No    Sexual activity: Not on file "     Review of Systems   Constitutional: Negative for appetite change, chills, diaphoresis, fatigue and fever.   HENT: Negative for congestion, ear pain, mouth sores, sore throat and trouble swallowing.    Eyes: Negative for pain and visual disturbance.   Respiratory: Positive for cough, chest tightness (with nebulizer treatments) and shortness of breath.    Cardiovascular: Negative for chest pain, palpitations and leg swelling.   Gastrointestinal: Negative for abdominal pain, constipation and nausea.   Endocrine: Negative for cold intolerance, heat intolerance, polydipsia and polyuria.   Genitourinary: Negative for dysuria, frequency and hematuria.   Musculoskeletal: Negative for arthralgias, back pain, myalgias and neck pain.   Skin: Negative for pallor, rash and wound.   Allergic/Immunologic: Negative for environmental allergies and immunocompromised state.   Neurological: Negative for dizziness, seizures, syncope, weakness, numbness and headaches.   Hematological: Negative for adenopathy. Does not bruise/bleed easily.   Psychiatric/Behavioral: Negative for agitation, confusion and sleep disturbance.     Objective:     Vital Signs (Most Recent):  Temp: 98.9 °F (37.2 °C) (12/28/17 1319)  Pulse: 105 (12/28/17 1419)  Resp: 20 (12/28/17 1407)  BP: 138/71 (12/28/17 1305)  SpO2: 97 % (12/28/17 1407) Vital Signs (24h Range):  Temp:  [98.9 °F (37.2 °C)] 98.9 °F (37.2 °C)  Pulse:  [] 105  Resp:  [18-20] 20  SpO2:  [95 %-97 %] 97 %  BP: (138)/(71) 138/71        There is no height or weight on file to calculate BMI.    Physical Exam   Constitutional: She is oriented to person, place, and time. She appears well-developed and well-nourished. She appears ill. No distress.   HENT:   Head: Normocephalic and atraumatic.   Eyes: Conjunctivae are normal.   PERRL   Neck: Neck supple. No JVD present.   Cardiovascular: Regular rhythm and normal heart sounds.  Tachycardia present.    Pulmonary/Chest: Accessory muscle usage  present. Tachypnea noted. She is in respiratory distress (mild). She has wheezes.   Abdominal: Soft. Bowel sounds are normal. She exhibits no distension. There is no tenderness.   Musculoskeletal: Normal range of motion.   Neurological: She is alert and oriented to person, place, and time.   Skin: Skin is warm and dry.   Psychiatric: She has a normal mood and affect. Her behavior is normal. Thought content normal.   Nursing note and vitals reviewed.          Significant Labs:   CBC:   Recent Labs  Lab 12/28/17  1405   WBC 19.67*   HGB 13.3   HCT 39.7   *     CMP:   Recent Labs  Lab 12/28/17  1405      K 4.7      CO2 25      BUN 14   CREATININE 0.7   CALCIUM 8.5*   PROT 6.7   ALBUMIN 3.3*   BILITOT 0.2   ALKPHOS 93   AST 13   ALT 10   ANIONGAP 11   EGFRNONAA >60     Troponin:   Recent Labs  Lab 12/28/17  1405   TROPONINI <0.006     All pertinent labs within the past 24 hours have been reviewed.    Significant Imaging: I have reviewed all pertinent imaging results/findings within the past 24 hours.   Imaging Results          X-Ray Chest AP Portable (Final result)  Result time 12/28/17 14:00:42    Final result by Jo-Ann Vazquez III, MD (12/28/17 14:00:42)                 Impression:     Stable COPD.  No acute disease identified in the chest.        Electronically signed by: JO-ANN VAZQUEZ MD  Date:     12/28/17  Time:    14:00              Narrative:    XR CHEST AP PORTABLE    Clinical history: sob.      Findings: Cardiomediastinal silhouette is within normal limits for AP technique.  Stable COPD with pulmonary hyperinflation and flattening of the diaphragms.  Stable chronic blunting of the costophrenic angles suggesting pleural scarring.  Stable small bilateral calcified granulomas. The lungs appear clear of active disease. No acute appearing infiltrate, sizable pleural effusion, pneumothorax or other acute disease identified.  Stable significant thoracolumbar scoliosis.                                 Assessment/Plan:     * Acute on chronic respiratory failure with hypoxia    -Due to COPD exacerbation.   -Continue oxygen therapy.   -Follow up d-dimer. VQ scan if positive.           COPD with acute exacerbation    -IV steroids.   -Continue Daliresp, inhaled medications and oxygen therapy.   -Empiric Avelox.         Leukocytosis    -Likely due to steroids.   -No infectious source identified.   -Follow up UA.   -Monitor.           Tobacco dependence    -Patient counseled on cessation.   -Declines nicotine patch.             VTE Risk Mitigation         Ordered     enoxaparin injection 40 mg  Daily     Route:  Subcutaneous        12/28/17 2501             HONG Soria  Department of Hospital Medicine   [facility]

## 2017-12-28 NOTE — HPI
Candace Wright is a 57 year old female with COPD and continued tobacco abuse who presented to the Emergency Room with complaints of SOB and cough. The patient reports that her SOB is worse with exertion, but she denies orthopnea. The cough is productive with green sputum production. Symptoms began 2 weeks ago. She was seen in the Pulmonology clinic on 12/20/17 and prescribed Azithromycin and a prednisone taper. She is almost finished with the prednisone and completed the antibiotic several days ago. She denies any improvement in symptoms with these interventions. She has been taking her nebulizer treatments several times daily without any improvement. There is associated intermittent chest heaviness with each nebulizer treatment. She denies fever and chills. In the ED, the patient was found to have mild hypoxia on ABG with a pO2 of 68 on her home dose of nasal cannula oxygen. Her chest x-ray was negative. She had mild tachycardia and her d-dimer is pending. Code status was discussed with the patient. She is a DNR. Her friend, Lindsay Bethany, is her surrogate medical decision maker.

## 2017-12-28 NOTE — SUBJECTIVE & OBJECTIVE
"Past Medical History:   Diagnosis Date    Cervical disc disorder with radiculopathy     COPD (chronic obstructive pulmonary disease)     Lumbar disc disease     Scoliosis     Spondylosis        Past Surgical History:   Procedure Laterality Date    BREAST SURGERY      HYSTERECTOMY      PELVIC LAPAROSCOPY         Review of patient's allergies indicates:   Allergen Reactions    Azithromycin Hives     Face swelling and welts    Iodinated contrast- oral and iv dye      "I could die. I had it once in [place] years ago and haven't had it since."     Asa [aspirin]     Bactrim [sulfamethoxazole-trimethoprim] Hives    Doxycycline Hives    Ibuprofen     Pcn [penicillins]     Avelox [moxifloxacin] Rash       No current facility-administered medications on file prior to encounter.      Current Outpatient Prescriptions on File Prior to Encounter   Medication Sig    albuterol-ipratropium 2.5mg-0.5mg/3mL (DUO-NEB) 0.5 mg-3 mg(2.5 mg base)/3 mL nebulizer solution Take 3 mLs by nebulization every 4 (four) hours as needed for Wheezing or Shortness of Breath. Rescue    azithromycin (ZITHROMAX Z-MELI) 250 MG tablet Take pack as directed    fluticasone (FLONASE) 50 mcg/actuation nasal spray 2 sprays by Each Nare route once daily.    fluticasone furoate (ARNUITY ELLIPTA) 100 mcg/actuation DsDv Inhale 100 mcg into the lungs once daily. Controller    morphine (MS CONTIN) 30 MG 12 hr tablet     oxycodone-acetaminophen (PERCOCET)  mg per tablet Take 1 tablet by mouth every 6 (six) hours as needed for Pain (Pain).    OXYGEN-AIR DELIVERY SYSTEMS MISC by [facility].(Non-Drug; Combo Route) route.    predniSONE (DELTASONE) 20 MG tablet 3 tab for 3 days. 2 tab for 3 days. 1 tab for 3 days. 1/2 tab for 3 days    roflumilast 500 mcg Tab Take 1 tablet (500 mcg total) by mouth once daily.    salmeterol (SEREVENT) 50 mcg/dose diskus inhaler Inhale 1 puff into the lungs 2 (two) times daily.    umeclidinium 62.5 mcg/actuation " DsDv Inhale 1 puff into the lungs once daily.     Family History     Problem Relation (Age of Onset)    Cancer     Leukemia         Social History Main Topics    Smoking status: Light Tobacco Smoker     Types: Cigarettes    Smokeless tobacco: Not on file    Alcohol use No    Drug use: No    Sexual activity: Not on file     Review of Systems   Constitutional: Negative for appetite change, chills, diaphoresis, fatigue and fever.   HENT: Negative for congestion, ear pain, mouth sores, sore throat and trouble swallowing.    Eyes: Negative for pain and visual disturbance.   Respiratory: Positive for cough, chest tightness (with nebulizer treatments) and shortness of breath.    Cardiovascular: Negative for chest pain, palpitations and leg swelling.   Gastrointestinal: Negative for abdominal pain, constipation and nausea.   Endocrine: Negative for cold intolerance, heat intolerance, polydipsia and polyuria.   Genitourinary: Negative for dysuria, frequency and hematuria.   Musculoskeletal: Negative for arthralgias, back pain, myalgias and neck pain.   Skin: Negative for pallor, rash and wound.   Allergic/Immunologic: Negative for environmental allergies and immunocompromised state.   Neurological: Negative for dizziness, seizures, syncope, weakness, numbness and headaches.   Hematological: Negative for adenopathy. Does not bruise/bleed easily.   Psychiatric/Behavioral: Negative for agitation, confusion and sleep disturbance.     Objective:     Vital Signs (Most Recent):  Temp: 98.9 °F (37.2 °C) (12/28/17 1319)  Pulse: 105 (12/28/17 1419)  Resp: 20 (12/28/17 1407)  BP: 138/71 (12/28/17 1305)  SpO2: 97 % (12/28/17 1407) Vital Signs (24h Range):  Temp:  [98.9 °F (37.2 °C)] 98.9 °F (37.2 °C)  Pulse:  [] 105  Resp:  [18-20] 20  SpO2:  [95 %-97 %] 97 %  BP: (138)/(71) 138/71        There is no height or weight on file to calculate BMI.    Physical Exam   Constitutional: She is oriented to person, place, and time. She  appears well-developed and well-nourished. She appears ill. No distress.   HENT:   Head: Normocephalic and atraumatic.   Eyes: Conjunctivae are normal.   PERRL   Neck: Neck supple. No JVD present.   Cardiovascular: Regular rhythm and normal heart sounds.  Tachycardia present.    Pulmonary/Chest: Accessory muscle usage present. Tachypnea noted. She is in respiratory distress (mild). She has wheezes.   Abdominal: Soft. Bowel sounds are normal. She exhibits no distension. There is no tenderness.   Musculoskeletal: Normal range of motion.   Neurological: She is alert and oriented to person, place, and time.   Skin: Skin is warm and dry.   Psychiatric: She has a normal mood and affect. Her behavior is normal. Thought content normal.   Nursing note and vitals reviewed.          Significant Labs:   CBC:   Recent Labs  Lab 12/28/17  1405   WBC 19.67*   HGB 13.3   HCT 39.7   *     CMP:   Recent Labs  Lab 12/28/17  1405      K 4.7      CO2 25      BUN 14   CREATININE 0.7   CALCIUM 8.5*   PROT 6.7   ALBUMIN 3.3*   BILITOT 0.2   ALKPHOS 93   AST 13   ALT 10   ANIONGAP 11   EGFRNONAA >60     Troponin:   Recent Labs  Lab 12/28/17  1405   TROPONINI <0.006     All pertinent labs within the past 24 hours have been reviewed.    Significant Imaging: I have reviewed all pertinent imaging results/findings within the past 24 hours.   Imaging Results          X-Ray Chest AP Portable (Final result)  Result time 12/28/17 14:00:42    Final result by Jo-Ann Vazquez III, MD (12/28/17 14:00:42)                 Impression:     Stable COPD.  No acute disease identified in the chest.        Electronically signed by: JO-ANN VAZQUEZ MD  Date:     12/28/17  Time:    14:00              Narrative:    XR CHEST AP PORTABLE    Clinical history: sob.      Findings: Cardiomediastinal silhouette is within normal limits for AP technique.  Stable COPD with pulmonary hyperinflation and flattening of the diaphragms.  Stable chronic  blunting of the costophrenic angles suggesting pleural scarring.  Stable small bilateral calcified granulomas. The lungs appear clear of active disease. No acute appearing infiltrate, sizable pleural effusion, pneumothorax or other acute disease identified.  Stable significant thoracolumbar scoliosis.

## 2017-12-29 VITALS
TEMPERATURE: 99 F | HEART RATE: 113 BPM | SYSTOLIC BLOOD PRESSURE: 127 MMHG | BODY MASS INDEX: 17.75 KG/M2 | WEIGHT: 94 LBS | OXYGEN SATURATION: 96 % | HEIGHT: 61 IN | RESPIRATION RATE: 18 BRPM | DIASTOLIC BLOOD PRESSURE: 63 MMHG

## 2017-12-29 LAB
ANION GAP SERPL CALC-SCNC: 10 MMOL/L
BASOPHILS # BLD AUTO: 0 K/UL
BASOPHILS NFR BLD: 0 %
BUN SERPL-MCNC: 13 MG/DL
CALCIUM SERPL-MCNC: 8.9 MG/DL
CHLORIDE SERPL-SCNC: 102 MMOL/L
CO2 SERPL-SCNC: 25 MMOL/L
CREAT SERPL-MCNC: 0.7 MG/DL
DIFFERENTIAL METHOD: ABNORMAL
EOSINOPHIL # BLD AUTO: 0 K/UL
EOSINOPHIL NFR BLD: 0 %
ERYTHROCYTE [DISTWIDTH] IN BLOOD BY AUTOMATED COUNT: 14.7 %
EST. GFR  (AFRICAN AMERICAN): >60 ML/MIN/1.73 M^2
EST. GFR  (NON AFRICAN AMERICAN): >60 ML/MIN/1.73 M^2
GLUCOSE SERPL-MCNC: 136 MG/DL
HCT VFR BLD AUTO: 38.9 %
HGB BLD-MCNC: 12.7 G/DL
LYMPHOCYTES # BLD AUTO: 0.6 K/UL
LYMPHOCYTES NFR BLD: 3.3 %
MAGNESIUM SERPL-MCNC: 2.2 MG/DL
MCH RBC QN AUTO: 30.9 PG
MCHC RBC AUTO-ENTMCNC: 32.6 G/DL
MCV RBC AUTO: 95 FL
MONOCYTES # BLD AUTO: 0.5 K/UL
MONOCYTES NFR BLD: 2.7 %
NEUTROPHILS # BLD AUTO: 15.5 K/UL
NEUTROPHILS NFR BLD: 94 %
PHOSPHATE SERPL-MCNC: 3.5 MG/DL
PLATELET # BLD AUTO: 351 K/UL
PMV BLD AUTO: 8.6 FL
POCT GLUCOSE: 152 MG/DL (ref 70–110)
POTASSIUM SERPL-SCNC: 4.7 MMOL/L
RBC # BLD AUTO: 4.11 M/UL
SODIUM SERPL-SCNC: 137 MMOL/L
TROPONIN I SERPL DL<=0.01 NG/ML-MCNC: 0.02 NG/ML
TROPONIN I SERPL DL<=0.01 NG/ML-MCNC: <0.006 NG/ML
WBC # BLD AUTO: 16.45 K/UL

## 2017-12-29 PROCEDURE — 84100 ASSAY OF PHOSPHORUS: CPT

## 2017-12-29 PROCEDURE — 25000242 PHARM REV CODE 250 ALT 637 W/ HCPCS: Performed by: PHYSICIAN ASSISTANT

## 2017-12-29 PROCEDURE — 94640 AIRWAY INHALATION TREATMENT: CPT

## 2017-12-29 PROCEDURE — G0378 HOSPITAL OBSERVATION PER HR: HCPCS

## 2017-12-29 PROCEDURE — 25000003 PHARM REV CODE 250: Performed by: NURSE PRACTITIONER

## 2017-12-29 PROCEDURE — 85025 COMPLETE CBC W/AUTO DIFF WBC: CPT

## 2017-12-29 PROCEDURE — 63600175 PHARM REV CODE 636 W HCPCS: Performed by: PHYSICIAN ASSISTANT

## 2017-12-29 PROCEDURE — 84484 ASSAY OF TROPONIN QUANT: CPT

## 2017-12-29 PROCEDURE — 96376 TX/PRO/DX INJ SAME DRUG ADON: CPT

## 2017-12-29 PROCEDURE — 83735 ASSAY OF MAGNESIUM: CPT

## 2017-12-29 PROCEDURE — 27000221 HC OXYGEN, UP TO 24 HOURS

## 2017-12-29 PROCEDURE — 36415 COLL VENOUS BLD VENIPUNCTURE: CPT

## 2017-12-29 PROCEDURE — 96374 THER/PROPH/DIAG INJ IV PUSH: CPT

## 2017-12-29 PROCEDURE — 80048 BASIC METABOLIC PNL TOTAL CA: CPT

## 2017-12-29 PROCEDURE — 25000003 PHARM REV CODE 250: Performed by: PHYSICIAN ASSISTANT

## 2017-12-29 PROCEDURE — 25000242 PHARM REV CODE 250 ALT 637 W/ HCPCS: Performed by: INTERNAL MEDICINE

## 2017-12-29 RX ORDER — LEVALBUTEROL INHALATION SOLUTION 0.63 MG/3ML
0.63 SOLUTION RESPIRATORY (INHALATION)
Status: DISCONTINUED | OUTPATIENT
Start: 2017-12-29 | End: 2017-12-29 | Stop reason: HOSPADM

## 2017-12-29 RX ORDER — PREDNISONE 20 MG/1
TABLET ORAL
Qty: 27 TABLET | Refills: 0 | Status: SHIPPED | OUTPATIENT
Start: 2017-12-29 | End: 2018-04-19 | Stop reason: ALTCHOICE

## 2017-12-29 RX ORDER — PROMETHAZINE HYDROCHLORIDE AND CODEINE PHOSPHATE 6.25; 1 MG/5ML; MG/5ML
5 SOLUTION ORAL ONCE
Status: DISCONTINUED | OUTPATIENT
Start: 2017-12-29 | End: 2017-12-29

## 2017-12-29 RX ORDER — PROMETHAZINE HYDROCHLORIDE AND CODEINE PHOSPHATE 6.25; 1 MG/5ML; MG/5ML
5 SOLUTION ORAL EVERY 6 HOURS PRN
Qty: 120 ML | Refills: 0 | Status: SHIPPED | OUTPATIENT
Start: 2017-12-29 | End: 2018-01-08

## 2017-12-29 RX ORDER — LEVOFLOXACIN 500 MG/1
500 TABLET, FILM COATED ORAL DAILY
Qty: 7 TABLET | Refills: 0 | Status: SHIPPED | OUTPATIENT
Start: 2017-12-29 | End: 2018-01-05

## 2017-12-29 RX ORDER — PROMETHAZINE HYDROCHLORIDE AND CODEINE PHOSPHATE 6.25; 1 MG/5ML; MG/5ML
5 SOLUTION ORAL EVERY 6 HOURS PRN
Status: DISCONTINUED | OUTPATIENT
Start: 2017-12-29 | End: 2017-12-29 | Stop reason: HOSPADM

## 2017-12-29 RX ORDER — PREDNISONE 20 MG/1
TABLET ORAL
Qty: 21 TABLET | Refills: 0 | Status: SHIPPED | OUTPATIENT
Start: 2017-12-29 | End: 2017-12-29

## 2017-12-29 RX ORDER — IPRATROPIUM BROMIDE AND ALBUTEROL SULFATE 2.5; .5 MG/3ML; MG/3ML
3 SOLUTION RESPIRATORY (INHALATION)
Qty: 1 BOX | Refills: 0 | Status: SHIPPED | OUTPATIENT
Start: 2017-12-29

## 2017-12-29 RX ADMIN — METHYLPREDNISOLONE SODIUM SUCCINATE 80 MG: 40 INJECTION, POWDER, FOR SOLUTION INTRAMUSCULAR; INTRAVENOUS at 05:12

## 2017-12-29 RX ADMIN — OXYCODONE HYDROCHLORIDE AND ACETAMINOPHEN 1 TABLET: 10; 325 TABLET ORAL at 08:12

## 2017-12-29 RX ADMIN — MOXIFLOXACIN HYDROCHLORIDE 400 MG: 400 TABLET, FILM COATED ORAL at 08:12

## 2017-12-29 RX ADMIN — LEVALBUTEROL 0.63 MG: 0.63 SOLUTION RESPIRATORY (INHALATION) at 07:12

## 2017-12-29 RX ADMIN — OXYCODONE HYDROCHLORIDE AND ACETAMINOPHEN 1 TABLET: 10; 325 TABLET ORAL at 12:12

## 2017-12-29 RX ADMIN — METHYLPREDNISOLONE SODIUM SUCCINATE 80 MG: 40 INJECTION, POWDER, FOR SOLUTION INTRAMUSCULAR; INTRAVENOUS at 01:12

## 2017-12-29 RX ADMIN — BUDESONIDE 0.5 MG: 0.5 SUSPENSION RESPIRATORY (INHALATION) at 07:12

## 2017-12-29 RX ADMIN — GUAIFENESIN AND DEXTROMETHORPHAN HYDROBROMIDE 1 TABLET: 600; 30 TABLET, EXTENDED RELEASE ORAL at 08:12

## 2017-12-29 RX ADMIN — PROMETHAZINE HYDROCHLORIDE AND CODEINE PHOSPHATE 5 ML: 10; 6.25 SOLUTION ORAL at 01:12

## 2017-12-29 RX ADMIN — ARFORMOTEROL TARTRATE 15 MCG: 15 SOLUTION RESPIRATORY (INHALATION) at 07:12

## 2017-12-29 RX ADMIN — FLUTICASONE PROPIONATE 2 SPRAY: 50 SPRAY, METERED NASAL at 08:12

## 2017-12-29 RX ADMIN — OXYCODONE HYDROCHLORIDE AND ACETAMINOPHEN 1 TABLET: 10; 325 TABLET ORAL at 03:12

## 2017-12-29 RX ADMIN — ROFLUMILAST 500 MCG: 500 TABLET ORAL at 08:12

## 2017-12-29 RX ADMIN — LEVALBUTEROL 0.63 MG: 0.63 SOLUTION RESPIRATORY (INHALATION) at 11:12

## 2017-12-29 NOTE — PROGRESS NOTES
Discharge instructions explained. PT verbalized understanding. IV removed. Tele monitor removed. No signs of acute distress noted. PT transported via wheelchair to front entrance. Upon arrival pts ride was not present proceeded to try to wheel pt back upstairs. Pt became upset and refused to return to the room. PT requested to be seated in the lobby. Megan Talamantes NP notified.

## 2017-12-29 NOTE — PLAN OF CARE
Problem: Patient Care Overview  Goal: Plan of Care Review  Outcome: Ongoing (interventions implemented as appropriate)  NSR-ST on monitor on 2L NC. VSS; POC reviewed with pt. Will continue to monitor.  Albuterol D/C per MD Opere due to pt being shaky/tachy; xopenex added q4H.

## 2017-12-29 NOTE — DISCHARGE SUMMARY
[facility] Hospital Medicine  Discharge Summary      Patient Name: Candace Wright  MRN: [id number]  Admission Date: 12/28/2017  Hospital Length of Stay: 0 days  Discharge Date and Time:  12/29/2017 4:59 PM  Attending Physician: Dr. Kapadia  Discharging Provider: Megan Talamantes NP  Primary Care Provider: Clara Meraz MD      HPI:   Candace Wright is a 57 year old female with COPD and continued tobacco abuse who presented to the Emergency Room with complaints of SOB and cough. The patient reports that her SOB is worse with exertion, but she denies orthopnea. The cough is productive with green sputum production. Symptoms began 2 weeks ago. She was seen in the Pulmonology clinic on 12/20/17 and prescribed Azithromycin and a prednisone taper. She is almost finished with the prednisone and completed the antibiotic several days ago. She denies any improvement in symptoms with these interventions. She has been taking her nebulizer treatments several times daily without any improvement. There is associated intermittent chest heaviness with each nebulizer treatment. She denies fever and chills. In the ED, the patient was found to have mild hypoxia on ABG with a pO2 of 68 on her home dose of nasal cannula oxygen. Her chest x-ray was negative. She had mild tachycardia and her d-dimer is pending. Code status was discussed with the patient. She is a DNR. Her friend, Lindsay Sims, is her surrogate medical decision maker.     * No surgery found *      Hospital Course:   The pt is a 57 year old female with severe COPD-on home oxygen who was placed in observation for COPD exacerbation, acute/chronic respiratory failure on IV steroids, neb txs, Iv antibiotics. The pt improved- breath sounds cleared with good air movement to bases. Leukocytosis noted- CXR clear. Flu swab negative. Afebrile. Likely secondary to steroids pt took at home PTA. The pt will be discharged on oral prednisone taper, Levaquin, Neb txs,  and promethazine cough syrup. The pt was seen and examined today and determined to be stable for discharge.      Consults:     No new Assessment & Plan notes have been filed under this hospital service since the last note was generated.  Service: Hospital Medicine    Final Active Diagnoses:    Diagnosis Date Noted POA    PRINCIPAL PROBLEM:  Acute on chronic respiratory failure with hypoxia [J96.21] 05/18/2016 Yes    COPD with acute exacerbation [J44.1] 12/28/2017 Yes    Tobacco dependence [F17.200] 05/25/2016 Yes    Leukocytosis [D72.829] 02/15/2015 Yes      Problems Resolved During this Admission:    Diagnosis Date Noted Date Resolved POA       Discharged Condition: stable    Disposition: Home or Self Care    Follow Up:  Follow-up Information     Clara Meraz MD In 3 days.    Specialty:  Family Medicine  Contact information:  [facility]  [place] [address]  [phone]             Jyoti Stover NP In 2 weeks.    Specialties:  Pulmonary Disease, Internal Medicine  Contact information:  [address]  [place] [address]  [phone]                 Patient Instructions:     Activity as tolerated     Activity as tolerated         Significant Diagnostic Studies:   Imaging Results          X-Ray Chest AP Portable (Final result)  Result time 12/28/17 14:00:42    Final result by Jo-Ann Sarmiento III, MD (12/28/17 14:00:42)                 Impression:     Stable COPD.  No acute disease identified in the chest.        Electronically signed by: JO-ANN SARMIENTO MD  Date:     12/28/17  Time:    14:00              Narrative:    XR CHEST AP PORTABLE    Clinical history: sob.      Findings: Cardiomediastinal silhouette is within normal limits for AP technique.  Stable COPD with pulmonary hyperinflation and flattening of the diaphragms.  Stable chronic blunting of the costophrenic angles suggesting pleural scarring.  Stable small bilateral calcified granulomas. The lungs appear clear of active disease.  No acute appearing infiltrate, sizable pleural effusion, pneumothorax or other acute disease identified.  Stable significant thoracolumbar scoliosis.                              Pending Diagnostic Studies:     None         Medications:  Reconciled Home Medications:   Discharge Medication List as of 12/29/2017  2:37 PM      START taking these medications    Details   levoFLOXacin (LEVAQUIN) 500 MG tablet Take 1 tablet (500 mg total) by mouth once daily., Starting Fri 12/29/2017, Until Fri 1/5/2018, Normal      promethazine-codeine 6.25-10 mg/5 ml (PHENERGAN WITH CODEINE) 6.25-10 mg/5 mL syrup Take 5 mLs by mouth every 6 (six) hours as needed for Cough., Starting Fri 12/29/2017, Until Mon 1/8/2018, Print         CONTINUE these medications which have CHANGED    Details   albuterol-ipratropium 2.5mg-0.5mg/3mL (DUO-NEB) 0.5 mg-3 mg(2.5 mg base)/3 mL nebulizer solution Take 3 mLs by nebulization every 6 (six) hours while awake. Rescue, Starting Fri 12/29/2017, Normal      predniSONE (DELTASONE) 20 MG tablet 3 tab for 5 days. 2 tab for 3 days. 1 tab for 3 days. 1/2 tab for 3 days, Normal         CONTINUE these medications which have NOT CHANGED    Details   fluticasone (FLONASE) 50 mcg/actuation nasal spray 2 sprays by Each Nare route once daily., Starting Fri 6/23/2017, Normal      fluticasone furoate (ARNUITY ELLIPTA) 100 mcg/actuation DsDv Inhale 100 mcg into the lungs once daily. Controller, Starting Fri 11/24/2017, Normal      morphine (MS CONTIN) 30 MG 12 hr tablet Starting 9/8/2016, Until Discontinued, Historical Med      oxycodone-acetaminophen (PERCOCET)  mg per tablet Take 1 tablet by mouth every 6 (six) hours as needed for Pain (Pain)., Starting 2/4/2016, Until Discontinued, Normal      OXYGEN-AIR DELIVERY SYSTEMS MISC by Misc.(Non-Drug; Combo Route) route., Until Discontinued, Historical Med      roflumilast 500 mcg Tab Take 1 tablet (500 mcg total) by mouth once daily., Starting 10/7/2016, Until  Discontinued, Normal      salmeterol (SEREVENT) 50 mcg/dose diskus inhaler Inhale 1 puff into the lungs 2 (two) times daily., Starting Mon 6/12/2017, Until Wed 12/20/2017, Normal      umeclidinium 62.5 mcg/actuation DsDv Inhale 1 puff into the lungs once daily., Starting Fri 6/23/2017, Normal         STOP taking these medications       azithromycin (ZITHROMAX Z-MELI) 250 MG tablet Comments:   Reason for Stopping:               Indwelling Lines/Drains at time of discharge:   Lines/Drains/Airways          No matching active lines, drains, or airways          Time spent on the discharge of patient: 42 minutes  Patient was seen and examined on the date of discharge and determined to be suitable for discharge.         Megan Talamantes NP  Department of Hospital Medicine  [facility]

## 2017-12-29 NOTE — PROGRESS NOTES
FARZAD Rosado notified of situation states it is okay for pt to wait in lobby for ride since she is discharged.

## 2017-12-29 NOTE — PLAN OF CARE
Problem: Patient Care Overview  Goal: Plan of Care Review  Outcome: Ongoing (interventions implemented as appropriate)  POC discussed w/patient, verbalized understanding. NSR/ST on monitor. VSS. Voids per BRP. Pt c/o pain relieved with percocet. Frequent weight change encouraged. Patient turns independently in bed. Fall precautions in place, bed alarm on. Patient denies needs at this time.

## 2017-12-29 NOTE — PROGRESS NOTES
"Patient arrived to unit from ED via stretcher.   Patient in room 225.  Transferred into bed with 1 assist.   Bedside report given with Kishore RN.  Charge nurse advised of patient arrival.   VS currently stable.   Tele monitored applied.   Patient oriented to room, rounding sheet and call bell.   Bed in lowest position, call light in reach.  Encouraged to notify of all needs.   Will continue to monitor.  BP (!) 153/94 (BP Location: Right arm, Patient Position: Sitting)   Pulse 100   Temp 96.4 °F (35.8 °C) (Oral)   Resp (!) 27   Ht 5' 1.2" (1.554 m)   Wt 42.6 kg (94 lb)   LMP  (LMP Unknown)   SpO2 (!) 94%   Breastfeeding? No   BMI 17.65 kg/m²     "

## 2017-12-29 NOTE — HOSPITAL COURSE
The pt is a 57 year old female with severe COPD-on home oxygen who was placed in observation for COPD exacerbation, acute/chronic respiratory failure on IV steroids, neb txs, Iv antibiotics. The pt improved- breath sounds cleared with good air movement to bases. Leukocytosis noted- CXR clear. Flu swab negative. Afebrile. Likely secondary to steroids pt took at home PTA. The pt will be discharged on oral prednisone taper, Levaquin, Neb txs, and promethazine cough syrup. The pt was seen and examined today and determined to be stable for discharge.

## 2018-01-02 ENCOUNTER — TELEPHONE (OUTPATIENT)
Dept: PULMONOLOGY | Facility: CLINIC | Age: 58
End: 2018-01-02

## 2018-01-02 RX ORDER — AZITHROMYCIN 250 MG/1
TABLET, FILM COATED ORAL
Qty: 6 TABLET | Refills: 0 | Status: SHIPPED | OUTPATIENT
Start: 2018-01-02 | End: 2018-04-19 | Stop reason: ALTCHOICE

## 2018-01-02 NOTE — TELEPHONE ENCOUNTER
Spoke with pt she stated that she needs another antibiotic. I asked her if she was taking the levaquin she said she had a reaction to it and stated the hospital does not give a damn as they didn't prescribe her another antibiotc. I told her that I would forward the message over to janet and she hung up the phone in my face. ----- Message from Tracey Hilton sent at 1/2/2018  3:30 PM CST -----  Contact: pt  She's calling stating that she needs a refill on her antibiotics sent to [facility], please advise [phone] (home)

## 2018-03-22 ENCOUNTER — TELEPHONE (OUTPATIENT)
Dept: PULMONOLOGY | Facility: CLINIC | Age: 58
End: 2018-03-22

## 2018-03-22 DIAGNOSIS — J44.9 CHRONIC OBSTRUCTIVE PULMONARY DISEASE, UNSPECIFIED COPD TYPE: Primary | ICD-10-CM

## 2018-03-27 DIAGNOSIS — J44.9 COPD, VERY SEVERE: ICD-10-CM

## 2018-04-19 ENCOUNTER — HOSPITAL ENCOUNTER (OUTPATIENT)
Dept: RADIOLOGY | Facility: HOSPITAL | Age: 58
Discharge: HOME | End: 2018-04-19
Attending: INTERNAL MEDICINE
Payer: MEDICAID

## 2018-04-19 ENCOUNTER — OFFICE VISIT (OUTPATIENT)
Dept: PULMONOLOGY | Facility: CLINIC | Age: 58
End: 2018-04-19
Payer: MEDICAID

## 2018-04-19 VITALS
RESPIRATION RATE: 22 BRPM | OXYGEN SATURATION: 97 % | BODY MASS INDEX: 19.02 KG/M2 | DIASTOLIC BLOOD PRESSURE: 90 MMHG | HEIGHT: 61 IN | HEART RATE: 127 BPM | WEIGHT: 100.75 LBS | SYSTOLIC BLOOD PRESSURE: 156 MMHG

## 2018-04-19 DIAGNOSIS — J44.9 CHRONIC OBSTRUCTIVE PULMONARY DISEASE, UNSPECIFIED COPD TYPE: ICD-10-CM

## 2018-04-19 DIAGNOSIS — J44.9 COPD, SEVERE: ICD-10-CM

## 2018-04-19 DIAGNOSIS — J20.9 ACUTE BRONCHITIS, UNSPECIFIED ORGANISM: Primary | ICD-10-CM

## 2018-04-19 PROCEDURE — 99214 OFFICE O/P EST MOD 30 MIN: CPT | Mod: S$PBB,,, | Performed by: NURSE PRACTITIONER

## 2018-04-19 PROCEDURE — 71046 X-RAY EXAM CHEST 2 VIEWS: CPT | Mod: 26,,, | Performed by: RADIOLOGY

## 2018-04-19 PROCEDURE — 71046 X-RAY EXAM CHEST 2 VIEWS: CPT | Mod: TC,FY,PO

## 2018-04-19 PROCEDURE — 99999 PR PBB SHADOW E&M-EST. PATIENT-LVL IV: CPT | Mod: PBBFAC,,, | Performed by: NURSE PRACTITIONER

## 2018-04-19 PROCEDURE — 99214 OFFICE O/P EST MOD 30 MIN: CPT | Mod: PBBFAC,25,PO | Performed by: NURSE PRACTITIONER

## 2018-04-19 RX ORDER — PREDNISONE 20 MG/1
TABLET ORAL
Qty: 21 TABLET | Refills: 0 | Status: SHIPPED | OUTPATIENT
Start: 2018-04-19

## 2018-04-19 RX ORDER — AZITHROMYCIN 250 MG/1
TABLET, FILM COATED ORAL
Qty: 6 TABLET | Refills: 0 | Status: SHIPPED | OUTPATIENT
Start: 2018-04-19

## 2018-04-19 NOTE — LETTER
April 19, 2018                   [facility]  [address] [place] [address]  Phone: [phone]  Fax: [phone] April 19, 2018     Patient: Candace Wright    YOB: 1960   Date of Visit: 4/19/2018       To Whom It May Concern:    Please allow Ms. Candace Wright to carry on her pulmonary medications including oxygen on her flight for her COPD. Please see attached list .    If you have any questions or concerns, please don't hesitate to call.    Sincerely,          Elizabeth Lejeune, NP

## 2018-04-19 NOTE — PROGRESS NOTES
"Subjective:      Patient ID: Candace Wright is a 57 y.o. female.    Chief Complaint: COPD    Patient presents to the office today for evaluation of cough.  She has COPD, chronic respiratory failure, severe scoliosis and tobacco abuse.  She states she has dark mucus.  She was overusing anoro and Arnuity Ellipta so ran out before she can refill.     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    Review of Systems   Constitutional: Negative.    HENT: Negative.    Respiratory: Positive for cough and shortness of breath.    Cardiovascular: Negative.    Musculoskeletal: Negative.    Gastrointestinal: Negative.    Neurological: Negative.    Psychiatric/Behavioral: Negative.      Objective:      Physical Exam   Constitutional: She is oriented to person, place, and time. She appears well-developed and well-nourished.   HENT:   Head: Normocephalic and atraumatic.   Mouth/Throat: Oropharynx is clear and moist.   Neck: Normal range of motion. Neck supple.   Cardiovascular: Normal rate and regular rhythm.  Exam reveals no gallop.    No murmur heard.  Pulmonary/Chest: Effort normal and breath sounds normal.   Abdominal: Soft. She exhibits no mass. There is no tenderness.   Musculoskeletal: Normal range of motion. She exhibits no edema.   Neurological: She is alert and oriented to person, place, and time.   Skin: Skin is warm and dry.   Psychiatric: She has a normal mood and affect.     Personal Diagnostic Review      Results for orders placed during the hospital " encounter of 04/19/18   X-Ray Chest PA And Lateral    Narrative EXAMINATION:  XR CHEST PA AND LATERAL    CLINICAL HISTORY:  SOB; Chronic obstructive pulmonary disease, unspecified    TECHNIQUE:  PA and lateral views of the chest were performed.    COMPARISON:  12/28/2017    FINDINGS:  The lungs are clear and free of infiltrate.  No pleural effusion or pneumothorax. The heart is not enlarged. The lungs are hyperexpanded.  Scattered calcified granulomas are noted.  There is significant scoliosis of the spine.      Impression 1.  No acute cardiopulmonary process.      Electronically signed by: Ruben Goldberg DO  Date:    04/19/2018  Time:    14:06         Assessment:       1. Acute bronchitis, unspecified organism    2. COPD, severe        Outpatient Encounter Prescriptions as of 4/19/2018   Medication Sig Dispense Refill    albuterol-ipratropium 2.5mg-0.5mg/3mL (DUO-NEB) 0.5 mg-3 mg(2.5 mg base)/3 mL nebulizer solution Take 3 mLs by nebulization every 6 (six) hours while awake. Rescue 1 Box 0    fluticasone (FLONASE) 50 mcg/actuation nasal spray 2 sprays by Each Nare route once daily. 1 Bottle 11    fluticasone furoate (ARNUITY ELLIPTA) 100 mcg/actuation DsDv Inhale 100 mcg into the lungs once daily. Controller 30 each 11    morphine (MS CONTIN) 30 MG 12 hr tablet   0    oxycodone-acetaminophen (PERCOCET)  mg per tablet Take 1 tablet by mouth every 6 (six) hours as needed for Pain (Pain). 10 tablet 0    OXYGEN-AIR DELIVERY SYSTEMS MISC by Misc.(Non-Drug; Combo Route) route.      roflumilast 500 mcg Tab Take 1 tablet (500 mcg total) by mouth once daily. 30 tablet 12    umeclidinium 62.5 mcg/actuation DsDv Inhale 1 puff into the lungs once daily. 30 each 11    azithromycin (ZITHROMAX Z-MELI) 250 MG tablet Take pack as directed 6 tablet 0    predniSONE (DELTASONE) 20 MG tablet 3 tab for 3 days. 2 tab for 3 days. 1 tab for 3 days. 1/2 tab for 3 days 21 tablet 0    [DISCONTINUED] azithromycin (ZITHROMAX  Z-MELI) 250 MG tablet Take pack as directed 6 tablet 0    [DISCONTINUED] predniSONE (DELTASONE) 20 MG tablet 3 tab for 5 days. 2 tab for 3 days. 1 tab for 3 days. 1/2 tab for 3 days 27 tablet 0    [DISCONTINUED] salmeterol (SEREVENT) 50 mcg/dose diskus inhaler Inhale 1 puff into the lungs 2 (two) times daily. 1 each 5     No facility-administered encounter medications on file as of 4/19/2018.      Orders Placed This Encounter   Procedures    Culture, Respiratory with Gram Stain     Standing Status:   Future     Standing Expiration Date:   6/18/2019     Plan:    Sputum culture.  Zpak  prednisone

## 2018-06-18 ENCOUNTER — TELEPHONE (OUTPATIENT)
Dept: PULMONOLOGY | Facility: CLINIC | Age: 58
End: 2018-06-18

## 2018-06-18 DIAGNOSIS — R06.02 SOB (SHORTNESS OF BREATH): Primary | ICD-10-CM

## 2018-06-18 NOTE — TELEPHONE ENCOUNTER
----- Message from Jackeline Faulkner sent at 6/18/2018 11:39 AM CDT -----  Contact: Pt q  States she's calling rg needing to have an antibiotic & Prednisone called in / picked up on a bacteria again and can be reached at [phone]//thanks/dbw     States to pls call when called in     [facility] - [place] - [address] [address]  [place] [address]  Phone: [phone] Fax: [phone]

## 2018-06-19 ENCOUNTER — TELEPHONE (OUTPATIENT)
Dept: PULMONOLOGY | Facility: CLINIC | Age: 58
End: 2018-06-19

## 2018-06-19 NOTE — TELEPHONE ENCOUNTER
----- Message from Rolo Hernandez MD sent at 6/19/2018  4:04 PM CDT -----  See Carlee Hernandez MD    ----- Message -----  From: Nimisha Powell LPN  Sent: 6/19/2018   2:21 PM  To: Rolo Hernandez MD    Pt requesting rx for abx and prednisone sent to pharmacy.  She states she feels like she is getting sick.  Please advise.

## 2018-10-12 DIAGNOSIS — J44.1 COPD WITH ACUTE EXACERBATION: ICD-10-CM

## 2018-10-12 RX ORDER — ROFLUMILAST 500 UG/1
500 TABLET ORAL DAILY
Qty: 30 TABLET | Refills: 11 | Status: SHIPPED | OUTPATIENT
Start: 2018-10-12 | End: 2018-10-19 | Stop reason: SDUPTHER

## 2018-10-19 DIAGNOSIS — J44.9 COPD, VERY SEVERE: ICD-10-CM

## 2018-10-19 DIAGNOSIS — J44.1 COPD WITH ACUTE EXACERBATION: ICD-10-CM

## 2018-10-19 RX ORDER — ROFLUMILAST 500 UG/1
500 TABLET ORAL DAILY
Qty: 90 TABLET | Refills: 3 | Status: SHIPPED | OUTPATIENT
Start: 2018-10-19 | End: 2018-10-23 | Stop reason: SDUPTHER

## 2018-10-23 DIAGNOSIS — J44.9 COPD, VERY SEVERE: ICD-10-CM

## 2018-10-23 DIAGNOSIS — J44.1 COPD WITH ACUTE EXACERBATION: ICD-10-CM

## 2018-10-23 RX ORDER — ROFLUMILAST 500 UG/1
500 TABLET ORAL DAILY
Qty: 90 TABLET | Refills: 3 | Status: SHIPPED | OUTPATIENT
Start: 2018-10-23 | End: 2018-11-08 | Stop reason: SDUPTHER

## 2018-11-05 DIAGNOSIS — J44.9 STAGE 3 SEVERE COPD BY GOLD CLASSIFICATION: Primary | ICD-10-CM

## 2018-11-05 DIAGNOSIS — J44.9 COPD, VERY SEVERE: ICD-10-CM

## 2018-11-05 PROBLEM — J44.1 COPD WITH ACUTE EXACERBATION: Status: RESOLVED | Noted: 2017-12-28 | Resolved: 2018-11-05

## 2018-11-08 DIAGNOSIS — J44.1 COPD WITH ACUTE EXACERBATION: ICD-10-CM

## 2018-11-08 RX ORDER — ROFLUMILAST 500 UG/1
500 TABLET ORAL DAILY
Qty: 90 TABLET | Refills: 3 | Status: SHIPPED | OUTPATIENT
Start: 2018-11-08 | End: 2018-11-08 | Stop reason: SDUPTHER

## 2018-11-08 RX ORDER — ROFLUMILAST 500 UG/1
500 TABLET ORAL DAILY
Qty: 90 TABLET | Refills: 3 | Status: SHIPPED | OUTPATIENT
Start: 2018-11-08

## 2018-11-14 ENCOUNTER — TELEPHONE (OUTPATIENT)
Dept: PHARMACY | Facility: CLINIC | Age: 58
End: 2018-11-14

## 2018-11-14 NOTE — TELEPHONE ENCOUNTER
Good Morning.    The prior authorization for Ms. Wright's Dalirep medication has been approved on her insurance plan.  The medication has been approved through 11/13/2019.    The patient has been notified and is aware of the medication approval.    Thanks.    Tanya Ramirez CPhT.  Patient Care Advocate   [facility]  [email or web address]

## 2019-11-05 ENCOUNTER — TELEPHONE (OUTPATIENT)
Dept: PULMONOLOGY | Facility: CLINIC | Age: 59
End: 2019-11-05

## 2019-11-05 NOTE — TELEPHONE ENCOUNTER
Initiated prior authorization request with patient's insurance for Daliresp 500MCG tablets prescription. Submitted online via [email or web address] (request key [id number]). Awaiting decision -- PA case ID [id number].

## 2019-11-11 DIAGNOSIS — J44.1 COPD WITH ACUTE EXACERBATION: ICD-10-CM

## 2019-11-11 RX ORDER — ROFLUMILAST 500 UG/1
500 TABLET ORAL DAILY
Qty: 90 TABLET | Refills: 3 | Status: CANCELLED | OUTPATIENT
Start: 2019-11-11

## 2019-11-11 NOTE — TELEPHONE ENCOUNTER
Robby Shaw with payor pharmacy: Authorized/approved 11/6/19 through 11/5/2020 quantity of 30; authorization number: [id number]    Approval letter faxed to pharmacy.